# Patient Record
Sex: MALE | Race: AMERICAN INDIAN OR ALASKA NATIVE | URBAN - METROPOLITAN AREA
[De-identification: names, ages, dates, MRNs, and addresses within clinical notes are randomized per-mention and may not be internally consistent; named-entity substitution may affect disease eponyms.]

---

## 2022-05-18 ENCOUNTER — OUT OF OFFICE VISIT (OUTPATIENT)
Dept: URBAN - METROPOLITAN AREA MEDICAL CENTER 31 | Facility: MEDICAL CENTER | Age: 52
End: 2022-05-18
Payer: SELF-PAY

## 2022-05-18 DIAGNOSIS — D64.89 ANEMIA DUE TO OTHER CAUSE: ICD-10-CM

## 2022-05-18 DIAGNOSIS — R19.5 ABNORMAL CONSISTENCY OF STOOL: ICD-10-CM

## 2022-05-18 DIAGNOSIS — R11.2 ACUTE NAUSEA WITH NONBILIOUS VOMITING: ICD-10-CM

## 2022-05-18 PROCEDURE — 99254 IP/OBS CNSLTJ NEW/EST MOD 60: CPT | Performed by: INTERNAL MEDICINE

## 2022-05-19 ENCOUNTER — OUT OF OFFICE VISIT (OUTPATIENT)
Dept: URBAN - METROPOLITAN AREA MEDICAL CENTER 31 | Facility: MEDICAL CENTER | Age: 52
End: 2022-05-19
Payer: SELF-PAY

## 2022-05-19 DIAGNOSIS — K20.80 ESOPHAGITIS DISSECANS SUPERFICIALIS: ICD-10-CM

## 2022-05-19 DIAGNOSIS — K29.60 ADENOPAPILLOMATOSIS GASTRICA: ICD-10-CM

## 2022-05-19 DIAGNOSIS — K92.2 ACUTE GASTROINTESTINAL BLEEDING: ICD-10-CM

## 2022-05-19 PROCEDURE — 43239 EGD BIOPSY SINGLE/MULTIPLE: CPT | Performed by: INTERNAL MEDICINE

## 2022-05-20 ENCOUNTER — OUT OF OFFICE VISIT (OUTPATIENT)
Dept: URBAN - METROPOLITAN AREA MEDICAL CENTER 31 | Facility: MEDICAL CENTER | Age: 52
End: 2022-05-20
Payer: SELF-PAY

## 2022-05-20 DIAGNOSIS — D64.89 ANEMIA DUE TO OTHER CAUSE: ICD-10-CM

## 2022-05-20 DIAGNOSIS — K92.1 ACUTE MELENA: ICD-10-CM

## 2022-05-20 DIAGNOSIS — R74.8 ABNORMAL ALKALINE PHOSPHATASE TEST: ICD-10-CM

## 2022-05-20 DIAGNOSIS — K26.9 CHILDHOOD DUODENAL ULCER: ICD-10-CM

## 2022-05-20 PROCEDURE — 99232 SBSQ HOSP IP/OBS MODERATE 35: CPT | Performed by: INTERNAL MEDICINE

## 2022-06-27 ENCOUNTER — WEB ENCOUNTER (OUTPATIENT)
Dept: URBAN - METROPOLITAN AREA CLINIC 115 | Facility: CLINIC | Age: 52
End: 2022-06-27

## 2022-06-27 ENCOUNTER — OFFICE VISIT (OUTPATIENT)
Dept: URBAN - METROPOLITAN AREA CLINIC 115 | Facility: CLINIC | Age: 52
End: 2022-06-27

## 2025-05-09 ENCOUNTER — LAB OUTSIDE AN ENCOUNTER (OUTPATIENT)
Dept: URBAN - METROPOLITAN AREA CLINIC 82 | Facility: CLINIC | Age: 55
End: 2025-05-09

## 2025-05-09 ENCOUNTER — OFFICE VISIT (OUTPATIENT)
Dept: URBAN - METROPOLITAN AREA CLINIC 82 | Facility: CLINIC | Age: 55
End: 2025-05-09
Payer: COMMERCIAL

## 2025-05-09 ENCOUNTER — DASHBOARD ENCOUNTERS (OUTPATIENT)
Age: 55
End: 2025-05-09

## 2025-05-09 DIAGNOSIS — K70.30 ALCOHOLIC CIRRHOSIS OF LIVER WITHOUT ASCITES: ICD-10-CM

## 2025-05-09 DIAGNOSIS — Z86.2 HISTORY OF ANEMIA: ICD-10-CM

## 2025-05-09 DIAGNOSIS — M79.89 LEG SWELLING: ICD-10-CM

## 2025-05-09 DIAGNOSIS — K26.9 DUODENAL ULCER: ICD-10-CM

## 2025-05-09 DIAGNOSIS — K21.9 GASTROESOPHAGEAL REFLUX DISEASE, UNSPECIFIED WHETHER ESOPHAGITIS PRESENT: ICD-10-CM

## 2025-05-09 DIAGNOSIS — D69.6 THROMBOCYTOPENIA: ICD-10-CM

## 2025-05-09 DIAGNOSIS — R63.0 ANOREXIA: ICD-10-CM

## 2025-05-09 PROBLEM — 302215000: Status: ACTIVE | Noted: 2025-05-09

## 2025-05-09 PROBLEM — 51868009: Status: ACTIVE | Noted: 2025-05-09

## 2025-05-09 PROBLEM — 420054005: Status: ACTIVE | Noted: 2025-05-09

## 2025-05-09 PROBLEM — 275538002: Status: ACTIVE | Noted: 2025-05-09

## 2025-05-09 PROBLEM — 235595009: Status: ACTIVE | Noted: 2025-05-09

## 2025-05-09 PROBLEM — 79890006: Status: ACTIVE | Noted: 2025-05-09

## 2025-05-09 PROCEDURE — 99244 OFF/OP CNSLTJ NEW/EST MOD 40: CPT | Performed by: INTERNAL MEDICINE

## 2025-05-09 PROCEDURE — 99204 OFFICE O/P NEW MOD 45 MIN: CPT | Performed by: INTERNAL MEDICINE

## 2025-05-09 NOTE — HPI-TODAY'S VISIT:
NEHA WIFE IS HERE  REFERED FOR LEG SWELLING  NO DYSPHAGIA,  NOT HUNGRAY.  NOT EATING MUCH.  PROBLEM WITH THYROID   REFLUX, TOO MUCH NO MEDIC  NAUSEA IF DONT EAT RIGHT AWAY  THROW UP BILE, NO BLOOD  BM NORMAL, NO BLEEDING  NO WT LOSS  ??NOT SURE OF CIRRHOSIS  ETOH, WAS DRINKING A LOT, STOP 1 MONTHS

## 2025-05-12 LAB
A/G RATIO: 0.6
ABSOLUTE BASOPHILS: 49
ABSOLUTE EOSINOPHILS: 415
ABSOLUTE LYMPHOCYTES: 1373
ABSOLUTE MONOCYTES: 543
ABSOLUTE NEUTROPHILS: 3721
AFP, SERUM, TUMOR MARKER: 3.5
ALBUMIN: 2.7
ALKALINE PHOSPHATASE: 305
ALT (SGPT): 29
AST (SGOT): 47
BASOPHILS: 0.8
BILIRUBIN, TOTAL: 0.9
BUN/CREATININE RATIO: 16
BUN: 25
CALCIUM: 8.4
CARBON DIOXIDE, TOTAL: 27
CHLORIDE: 99
CREATININE: 1.56
EGFR: 53
EOSINOPHILS: 6.8
GLOBULIN, TOTAL: 4.4
GLUCOSE: 264
HEMATOCRIT: 34.8
HEMOGLOBIN: 11.9
INR: 1.2
LYMPHOCYTES: 22.5
MCH: 32.7
MCHC: 34.2
MCV: 95.6
MONOCYTES: 8.9
MPV: 9.3
NEUTROPHILS: 61
PLATELET COUNT: 151
POTASSIUM: 3.8
PROTEIN, TOTAL: 7.1
PT: 12.1
RDW: 11.9
RED BLOOD CELL COUNT: 3.64
SODIUM: 134
WHITE BLOOD CELL COUNT: 6.1

## 2025-05-22 ENCOUNTER — OFFICE VISIT (OUTPATIENT)
Dept: URBAN - METROPOLITAN AREA SURGERY CENTER 13 | Facility: SURGERY CENTER | Age: 55
End: 2025-05-22

## 2025-05-22 ENCOUNTER — CLAIMS CREATED FROM THE CLAIM WINDOW (OUTPATIENT)
Dept: URBAN - METROPOLITAN AREA SURGERY CENTER 13 | Facility: SURGERY CENTER | Age: 55
End: 2025-05-22
Payer: COMMERCIAL

## 2025-05-22 DIAGNOSIS — K70.30 ALCOHOLIC CIRRHOSIS OF LIVER WITHOUT ASCITES: ICD-10-CM

## 2025-05-22 DIAGNOSIS — K21.9 GASTRO-ESOPHAGEAL REFLUX DISEASE WITHOUT ESOPHAGITIS: ICD-10-CM

## 2025-05-22 DIAGNOSIS — K29.00 ACUTE GASTRITIS WITHOUT HEMORRHAGE, UNSPECIFIED GASTRITIS TYPE: ICD-10-CM

## 2025-05-22 PROCEDURE — 00731 ANES UPR GI NDSC PX NOS: CPT | Performed by: ANESTHESIOLOGY

## 2025-05-22 PROCEDURE — 00731 ANES UPR GI NDSC PX NOS: CPT | Performed by: ANESTHESIOLOGIST ASSISTANT

## 2025-05-27 ENCOUNTER — LAB OUTSIDE AN ENCOUNTER (OUTPATIENT)
Dept: URBAN - METROPOLITAN AREA CLINIC 82 | Facility: CLINIC | Age: 55
End: 2025-05-27

## 2025-05-27 ENCOUNTER — TELEPHONE ENCOUNTER (OUTPATIENT)
Dept: URBAN - METROPOLITAN AREA CLINIC 82 | Facility: CLINIC | Age: 55
End: 2025-05-27

## 2025-05-27 RX ORDER — SPIRONOLACTONE 50 MG/1
ONE TABLET TABLET, FILM COATED ORAL DAILY
Qty: 30 | Refills: 1 | OUTPATIENT
Start: 2025-05-27

## 2025-05-27 RX ORDER — FUROSEMIDE 20 MG/1
1 TABLET TABLET ORAL ONCE A DAY
Qty: 30 | Refills: 1 | OUTPATIENT
Start: 2025-05-27 | End: 2025-07-26

## 2025-06-03 ENCOUNTER — LAB OUTSIDE AN ENCOUNTER (OUTPATIENT)
Dept: URBAN - METROPOLITAN AREA CLINIC 82 | Facility: CLINIC | Age: 55
End: 2025-06-03